# Patient Record
Sex: FEMALE | Race: WHITE | Employment: FULL TIME | URBAN - METROPOLITAN AREA
[De-identification: names, ages, dates, MRNs, and addresses within clinical notes are randomized per-mention and may not be internally consistent; named-entity substitution may affect disease eponyms.]

---

## 2023-11-15 ENCOUNTER — APPOINTMENT (OUTPATIENT)
Dept: GENERAL RADIOLOGY | Age: 33
End: 2023-11-15
Payer: COMMERCIAL

## 2023-11-15 ENCOUNTER — HOSPITAL ENCOUNTER (EMERGENCY)
Age: 33
Discharge: HOME | End: 2023-11-15
Attending: EMERGENCY MEDICINE
Payer: COMMERCIAL

## 2023-11-15 VITALS
WEIGHT: 210 LBS | HEART RATE: 86 BPM | RESPIRATION RATE: 18 BRPM | TEMPERATURE: 97.5 F | SYSTOLIC BLOOD PRESSURE: 123 MMHG | OXYGEN SATURATION: 98 % | DIASTOLIC BLOOD PRESSURE: 76 MMHG

## 2023-11-15 DIAGNOSIS — S68.126A: ICD-10-CM

## 2023-11-15 DIAGNOSIS — T14.8XXA CRUSH INJURY: Primary | ICD-10-CM

## 2023-11-15 DIAGNOSIS — S82.899B: ICD-10-CM

## 2023-11-15 PROCEDURE — 2580000003 HC RX 258: Performed by: EMERGENCY MEDICINE

## 2023-11-15 PROCEDURE — 99284 EMERGENCY DEPT VISIT MOD MDM: CPT

## 2023-11-15 PROCEDURE — 96374 THER/PROPH/DIAG INJ IV PUSH: CPT

## 2023-11-15 PROCEDURE — 73130 X-RAY EXAM OF HAND: CPT

## 2023-11-15 PROCEDURE — 90714 TD VACC NO PRESV 7 YRS+ IM: CPT | Performed by: EMERGENCY MEDICINE

## 2023-11-15 PROCEDURE — 6360000002 HC RX W HCPCS

## 2023-11-15 PROCEDURE — 90471 IMMUNIZATION ADMIN: CPT | Performed by: EMERGENCY MEDICINE

## 2023-11-15 PROCEDURE — 6360000002 HC RX W HCPCS: Performed by: EMERGENCY MEDICINE

## 2023-11-15 RX ORDER — FENTANYL CITRATE 50 UG/ML
50 INJECTION, SOLUTION INTRAMUSCULAR; INTRAVENOUS ONCE
Status: COMPLETED | OUTPATIENT
Start: 2023-11-15 | End: 2023-11-15

## 2023-11-15 RX ORDER — HYDROCODONE BITARTRATE AND ACETAMINOPHEN 5; 325 MG/1; MG/1
1 TABLET ORAL EVERY 4 HOURS PRN
Qty: 10 TABLET | Refills: 0 | Status: SHIPPED | OUTPATIENT
Start: 2023-11-15 | End: 2023-11-18

## 2023-11-15 RX ORDER — CEPHALEXIN 500 MG/1
500 CAPSULE ORAL 4 TIMES DAILY
Qty: 40 CAPSULE | Refills: 0 | Status: SHIPPED | OUTPATIENT
Start: 2023-11-15 | End: 2023-11-25

## 2023-11-15 RX ORDER — TETANUS AND DIPHTHERIA TOXOIDS ADSORBED 2; 2 [LF]/.5ML; [LF]/.5ML
0.5 INJECTION INTRAMUSCULAR ONCE
Status: COMPLETED | OUTPATIENT
Start: 2023-11-15 | End: 2023-11-15

## 2023-11-15 RX ORDER — FENTANYL CITRATE 50 UG/ML
INJECTION, SOLUTION INTRAMUSCULAR; INTRAVENOUS
Status: COMPLETED
Start: 2023-11-15 | End: 2023-11-15

## 2023-11-15 RX ADMIN — FENTANYL CITRATE 50 MCG: 50 INJECTION, SOLUTION INTRAMUSCULAR; INTRAVENOUS at 15:51

## 2023-11-15 RX ADMIN — CEFAZOLIN 2000 MG: 2 INJECTION, POWDER, FOR SOLUTION INTRAMUSCULAR; INTRAVENOUS at 15:21

## 2023-11-15 RX ADMIN — TETANUS AND DIPHTHERIA TOXOIDS ADSORBED 0.5 ML: 2; 2 INJECTION INTRAMUSCULAR at 15:20

## 2023-11-15 ASSESSMENT — PAIN DESCRIPTION - PAIN TYPE: TYPE: ACUTE PAIN

## 2023-11-15 ASSESSMENT — PAIN - FUNCTIONAL ASSESSMENT: PAIN_FUNCTIONAL_ASSESSMENT: 0-10

## 2023-11-15 ASSESSMENT — PAIN SCALES - GENERAL
PAINLEVEL_OUTOF10: 8
PAINLEVEL_OUTOF10: 8

## 2023-11-15 ASSESSMENT — PAIN DESCRIPTION - DESCRIPTORS: DESCRIPTORS: ACHING

## 2023-11-15 ASSESSMENT — PAIN DESCRIPTION - FREQUENCY: FREQUENCY: CONTINUOUS

## 2023-11-15 ASSESSMENT — PAIN DESCRIPTION - ORIENTATION: ORIENTATION: RIGHT

## 2023-11-15 ASSESSMENT — PAIN DESCRIPTION - LOCATION: LOCATION: HAND

## 2023-11-16 ENCOUNTER — TELEPHONE (OUTPATIENT)
Dept: ORTHOPEDIC SURGERY | Age: 33
End: 2023-11-16

## 2023-11-21 ENCOUNTER — OFFICE VISIT (OUTPATIENT)
Dept: ORTHOPEDIC SURGERY | Age: 33
End: 2023-11-21

## 2023-11-21 VITALS — WEIGHT: 210 LBS | BODY MASS INDEX: 31.1 KG/M2 | HEIGHT: 69 IN

## 2023-11-21 DIAGNOSIS — S62.639A CLOSED FRACTURE OF TUFT OF DISTAL PHALANX OF FINGER: Primary | ICD-10-CM

## 2023-11-21 DIAGNOSIS — S67.10XA CRUSHING INJURY OF FINGER, INITIAL ENCOUNTER: ICD-10-CM

## 2023-11-21 PROCEDURE — 99203 OFFICE O/P NEW LOW 30 MIN: CPT | Performed by: STUDENT IN AN ORGANIZED HEALTH CARE EDUCATION/TRAINING PROGRAM

## 2023-11-21 RX ORDER — CEPHALEXIN 500 MG/1
500 CAPSULE ORAL 4 TIMES DAILY
Qty: 56 CAPSULE | Refills: 0 | Status: SHIPPED | OUTPATIENT
Start: 2023-11-21 | End: 2023-12-05

## 2023-11-21 NOTE — PROGRESS NOTES
[facility]  ORTHOPAEDIC SURGERY  DATE OF VISIT: 11/21/23  New Wrist/Hand Patient Visit     Referring Provider:   11/15/2023 (3 hours)  [facility]    CHIEF COMPLAINT:   Chief Complaint   Patient presents with    ED Follow-up     11/15/2023: R small finger open tuft fx. KARISSA injury, FREDRICK HANCOCK. HPI:     Vito Byrd is a 26y.o. year old female who is seen today  for evaluation of right small finger pain. She reports the pain has been ongoing since the time of the injury  She does recall a specific injury which started the pain. Medical machine resulting in open fracture of her right small finger distal phalanx. She  reports the pain is worse with use, better with rest.  The patient does not have mechanical symptoms. She does not have night pain. She denies a feeling of instability. The prior treatments have been irrigation and debridement with closure of the wound in the ED and she was placed on Keflex. The patient has not responded to the treatment. The patient is right hand dominant. The patient is working. 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 Patient states that she took down her dressing from the ED and has been placing Vaseline and other ointments on her hand and finger regularly. PAST MEDICAL HISTORY  No past medical history on file. PAST SURGICAL HISTORY  No past surgical history on file. FAMILY HISTORY   No family history on file.     SOCIAL HISTORY  Social History     Occupational History    Not on file   Tobacco Use    Smoking status: Not on file    Smokeless tobacco: Not on file   Substance and Sexual Activity    Alcohol use: Not on file    Drug use: Not on file    Sexual activity: Not on file       CURRENT MEDICATIONS     Current Outpatient Medications:     cephALEXin (KEFLEX) 500 MG capsule, Take 1 capsule by mouth 4 times daily for 10 days, Disp: 40 capsule, Rfl: 0    ALLERGIES  Allergies   Allergen Reactions

## 2023-11-22 ENCOUNTER — TELEPHONE (OUTPATIENT)
Dept: ORTHOPEDIC SURGERY | Age: 33
End: 2023-11-22

## 2023-11-28 ENCOUNTER — OFFICE VISIT (OUTPATIENT)
Dept: ORTHOPEDIC SURGERY | Age: 33
End: 2023-11-28

## 2023-11-28 DIAGNOSIS — S62.639A CLOSED FRACTURE OF TUFT OF DISTAL PHALANX OF FINGER: Primary | ICD-10-CM

## 2023-11-28 DIAGNOSIS — S67.10XA CRUSHING INJURY OF FINGER, INITIAL ENCOUNTER: ICD-10-CM

## 2023-11-28 PROCEDURE — 99213 OFFICE O/P EST LOW 20 MIN: CPT | Performed by: STUDENT IN AN ORGANIZED HEALTH CARE EDUCATION/TRAINING PROGRAM

## 2023-11-28 RX ORDER — DOXYCYCLINE HYCLATE 100 MG
100 TABLET ORAL 2 TIMES DAILY
Qty: 14 TABLET | Refills: 0 | Status: SHIPPED | OUTPATIENT
Start: 2023-11-28 | End: 2023-12-05

## 2023-11-28 RX ORDER — CETIRIZINE HYDROCHLORIDE 10 MG/1
10 TABLET ORAL DAILY
Qty: 30 TABLET | Refills: 0 | Status: SHIPPED | OUTPATIENT
Start: 2023-11-28 | End: 2023-12-28

## 2023-11-28 NOTE — PROGRESS NOTES
[facility]  ORTHOPAEDIC SURGERY   DATE OF VISIT: 11/28/23  Follow Up Visit     CHIEF COMPLAINT:   Chief Complaint   Patient presents with    Wound Check     1 week f/u R small finger open tuft fx         Rosalio Gray returns today for follow up visit regarding the open mario fracture of her right small finger. Treatment thus far has included ED I&D with closure by ortho resident and peroxide dilute soaking and abx. She reports symptoms are improving. Physical Exam:     No data recorded    General: Alert and oriented x3, no acute distress  Psych: mentation normal, non pressured speech   Cardiovascular/pulmonary: No labored breathing, peripheral perfusion intact  Musculoskeletal:    Hand/Wrist:  Tissue maceration at previous exam has significantly improved there is good eschar formation over the wounds now  Monocryl sutures in place. There is also a dorsal wound over the proximal phalanx of the ring finger without involvement of the extensor tendon. This has also had a good eschar begin to form  She states she has limited sensation over the ulnar and radial digital nerve distributions of the distal phalanx of her small finger  +2 radial pulse she is able to make a fist and extend the hand other than limited motion of her small finger secondary to pain    Controlled Substances Monitoring:      Imaging:  Prior imaging reviewed: XRAY:  3 views of the right hand demonstrating soft tissue injury with tuft fracture about the distal phalanx of the small finger.   Imaging also demonstrates radiopaque object      Assessment:   Open tuft fracture of the right small finger distal phalanx with laceration to the dorsum of the right ring finger without involvement of the extensor tendon with good healing      Plan:   Continue with nonweightbearing right hand  Ulnar gutter splint given  That she will increase her dilute peroxide soaks to twice a day  I have switched her from her Keflex to doxycycline since she states she

## 2023-12-05 ENCOUNTER — OFFICE VISIT (OUTPATIENT)
Dept: ORTHOPEDIC SURGERY | Age: 33
End: 2023-12-05

## 2023-12-05 DIAGNOSIS — S67.10XA CRUSHING INJURY OF FINGER, INITIAL ENCOUNTER: ICD-10-CM

## 2023-12-05 DIAGNOSIS — S62.639A CLOSED FRACTURE OF TUFT OF DISTAL PHALANX OF FINGER: Primary | ICD-10-CM

## 2023-12-05 NOTE — PROGRESS NOTES
given- use when not in controlled environments  Continue dilute peroxide soaks for 1 more week. She has completed her abx and rash has improved  She will begin OT to work on ROM   Follow up in 2 weeks for repeat evaluation    E/M EST Level 3- Greater than 20 minutes were spent with patient including history, exam, review of imaging (not including talking), discussion of diagnosis and treatment options, answering questions, and documentation. Christoph GHOTRA  [address] [facility]   Orthopaedic Surgery   12/5/23  3:38 PM

## 2023-12-07 ENCOUNTER — TELEPHONE (OUTPATIENT)
Dept: PHYSICAL THERAPY | Age: 33
End: 2023-12-07

## 2023-12-14 ENCOUNTER — EVALUATION (OUTPATIENT)
Dept: OCCUPATIONAL THERAPY | Age: 33
End: 2023-12-14

## 2023-12-14 DIAGNOSIS — S62.639A CLOSED FRACTURE OF TUFT OF DISTAL PHALANX OF FINGER: Primary | ICD-10-CM

## 2023-12-14 DIAGNOSIS — S67.10XA CRUSHING INJURY OF FINGER, INITIAL ENCOUNTER: ICD-10-CM

## 2023-12-14 NOTE — PROGRESS NOTES
LE Dressing:  X        Toileting:  X        Transfers:  X          Comments: Pt reports guarding of R hand SF for nearly all ADL tasks due to limited mobility.     Pain Level: No pain at rest, but when hitting digit on any surface, pain increases to 7/10 throbbing pain    UE Assessment:  Comment: Hand Dominance is right  All sites exhibit closure - superficial to dorsal aspect of PIP, fingernail, and handley aspect of DIP (healing sites on back of dorsal aspects of LF and SF)  Sutures scabs still visible  Skin very dry on all aspects of SF  Thick tissue granulation on handley aspect of SF   Moderate scar tissue beneath all sites  Opp pinching intact  Composite fist intact with exception of SF    R Hand ROM   AROM [x]         PROM[] IE        4th Digit MCP Flexion/Extension */ 0-45 H 81*       PIP Flexion/Extension 100*/ 0 100*       DIP Flexion/Extension 70-90*/0 75*        5th Digit MCP Flexion/Extension */ 0-45 H 83*       PIP Flexion/Extension 100*/ 0 46*       DIP Flexion/Extension 70-90*/0 14*        Sensation:   Numbness reported on handley aspect of RF  No hypersensitivity reported upon palpation to tip of SF but reported when SF hits another object  Numbness reported superficial to nail on SF    Able To Sense (Y) / Unable to Sense (N)  SEMMES-CAMERON Thumb 2nd Digit 3rd Digit  4th Digit  5th Digit    Normal Touch  Size: 2.83 Y Y Y     Diminished Light Touch   Size: 3.61    Y Y   Diminished Protective Sense  Size: 4.31        Loss of Protective Sense   Size: 4.56        Loss of Sensation  Size: 6.65          Edema Description/Circumferential Measurements:   Superficial to PIP joint of SF--   R: 8.0 cm   L: 7.5 cm     Figure 8 Measurement--   R: 45.3 cm   L: 43.8 cm    Dynamometer (setting 2) IE       Left deferred        Right  deferred       Pinch (lateral)         Left  deferred       Right  deferred       Pinch (tripod)         Left  deferred       Right  deferred          9 Hole Peg test  IE

## 2023-12-27 ENCOUNTER — TREATMENT (OUTPATIENT)
Dept: OCCUPATIONAL THERAPY | Age: 33
End: 2023-12-27

## 2023-12-27 DIAGNOSIS — S62.639A CLOSED FRACTURE OF TUFT OF DISTAL PHALANX OF FINGER: Primary | ICD-10-CM

## 2023-12-27 DIAGNOSIS — S67.10XA CRUSHING INJURY OF FINGER, INITIAL ENCOUNTER: ICD-10-CM

## 2023-12-27 NOTE — PROGRESS NOTES
using SF x30 reps  -Big peg activity x25 reps  -Rotate dice x10 reps   Wound Care X -Attempt to remove pinpoint object on tip of SF   Strengthening:     R hand digital  Deferred until cleared for WB by physician   R UE Forearm/Wrist  Deferred until cleared for WB by physician   Other:     Splints X -Size Large silicone tube issued to protect R hand SF tip due to hypersensitivity          Assessment/Comments: Pt is cleared for WBAT as tolerated starting January 2, as per most recent physician follow-up appointment. X-ray was interpreted to reveal maintained alignment of tuft fracture. No sharp pains reportedfollowing fall onto affected hand indicates fracture was unaffected with increased edema noted. Attempt was made to remove pinpoint object on tip of SF. Pt educated that if this is a suture or splinter, it should continue to work itself out of the skin and will continue to be monitored. ROM gains shown below. PROM introduced within tolerance to IP joints of SF. Pt demonstrated mild irritation with deconstruction of bunch'ems due to hypersensitivity but was able to complete assigned task. Next session initiate weight-bearing as tolerated. IE 12/27     5th Digit MCP Flexion/Extension */ 0-45 H 83*  88*         PIP Flexion/Extension 100*/ 0 46* 82*          DIP Flexion/Extension 70-90*/0 14* 48*            -Rehab Potential: Good   -Patient Response to Treatment: Pt verbalized continued dedication to HEP. Patient. Education:  [x] Plans/Goals, Risks/Benefits discussed  [x] Home exercise program  Method of Education: [x] Verbal  [x] Demo  [] Written  Comprehension of Education:  [x] Verbalizes understanding. [x] Demonstrates understanding. [] Needs Review. [] Demonstrates/verbalizes understanding of HEP/Ed previously given.     Time In:11:00 am            Time Out: 11:55 am             CODE  Minutes  Units   09807 Fluidotherapy     44521 Paraffin 10 1   72062 Ultrasound     69621 Electrical Stim -

## 2024-01-04 ENCOUNTER — TREATMENT (OUTPATIENT)
Dept: OCCUPATIONAL THERAPY | Age: 34
End: 2024-01-04

## 2024-01-04 DIAGNOSIS — S62.639A CLOSED FRACTURE OF TUFT OF DISTAL PHALANX OF FINGER: Primary | ICD-10-CM

## 2024-01-04 DIAGNOSIS — S67.10XA CRUSHING INJURY OF FINGER, INITIAL ENCOUNTER: ICD-10-CM

## 2024-01-04 NOTE — PROGRESS NOTES
-Exposure to 3 textures for desensitization and to promote digital nerve regeneration + HEP   Fine Motor          -In-hand manipulation of graded particles (foam pieces, marbles, pennies)  -Small peg activity with thumb/SF x15 reps place/remove  -Deconstruct bunch'ems using SF x30 reps  -Big peg activity x25 reps  -Rotate dice x10 reps   Wound Care  -Attempt to remove pinpoint object on tip of SF   Strengthening:     R hand digital  -Light resistance putty: pick pegs out of putty   R UE Forearm/Wrist     Other:     Splints X -Size Large silicone tube issued to protect R hand SF tip due to hypersensitivity          Assessment/Comments: Pt is now able to initiate WB as per most recent physician note. See baseline measurements below. Goal upgraded to address endurance for resistive activity with incorporation of SF as /pinch strength is WNLs for age/gender (referring physician cosign required for approval). PIP extension lag detected this session with education on causation. PIP extension place and holds introduced and added to HEP with successful return demonstration. Scar pads issued to align scar collagen fibers. Edema glove issued for wear during HoS for compression of scar pads and to reduce edema. PROM heavily incorporated this session due to no weight-bearing restrictions. This advanced active motion as shown below. Next session, address improving endurance for resistive use of SF.      IE 12/27 01/04    5th Digit MCP Flexion/Extension */ 0-45 H 83*  88*  90*       PIP Flexion/Extension 100*/ 0 46* 82*   -11*/92*       DIP Flexion/Extension 70-90*/0 14* 48*   55*             Dynamometer (setting 2) IE  01/04     Left deferred  93#      Right  deferred  90#     Pinch (lateral)         Left  deferred  26#     Right  deferred  29#     Pinch (tripod)         Left  deferred  23#     Right  deferred  27#    **Norm for age/gender for  strength is 74# and 66#  **Norm for age/gender for lateral key pinch

## 2024-01-11 ENCOUNTER — TREATMENT (OUTPATIENT)
Dept: OCCUPATIONAL THERAPY | Age: 34
End: 2024-01-11

## 2024-01-11 DIAGNOSIS — S67.10XA CRUSHING INJURY OF FINGER, INITIAL ENCOUNTER: ICD-10-CM

## 2024-01-11 DIAGNOSIS — S62.639A CLOSED FRACTURE OF TUFT OF DISTAL PHALANX OF FINGER: Primary | ICD-10-CM

## 2024-01-11 NOTE — PROGRESS NOTES
OCCUPATIONAL THERAPY DAILY NOTE    [facility] PHYSICIANS [place] [facility] OT  [address] [facility] [address]  Dept: [phone]  Loc: [phone]   [facility] OT Fax: [phone]      Date:  2023  Initial Evaluation Date: 2023   Evaluating Therapist: Lady Bridges OT    Patient Name:  Ciera Perry    :  1990    Restrictions/Precautions:  As per protocol for distal phalanx tuft fracture, low fall risk  Diagnosis:    S62.639A (ICD-10-CM) - Closed fracture of tuft of distal phalanx of finger  S67.10XA (ICD-10-CM) - Crushing injury of finger, initial encounter  Date of Surgery/Injury: 11/15/2023 inj (4 weeks)     Insurance/Certification information: [place] [facility] - Claim #[id number]  Plan of care signed (Y/N): N  Visit# / total visits: -     Referring Practitioner:  Cristóbal Olivia DO   Specific Practitioner Orders: OT Evaluate and Treat     NWB to right hand     Ulnar gutter splint previously issued for wear when not in controlled environments     Work on ROM     Assessment of current deficits   [] Functional mobility             [x] ADLs           [x] Strength                  [] Cognition   [] Functional transfers           [x] IADLs          [] Safety Awareness  [x] Endurance   [x] Fine Motor Coordination    [] Balance      [] Vision/perception    [x] Sensation     [] Gross Motor Coordination [x] ROM           [x] Pain                        [x] Edema          [x] Scar Adhesion/Skin Integrity      OT PLAN OF CARE   OT POC based on physician orders, patient diagnosis and results of clinical assessment     Frequency/Duration 1-2x / week for 8-12 visits.   Certification period From: 2023  To: 2023      Specific OT Treatment to include:   [x] Instruction in HEP                   Modalities:  [x] Therapeutic Exercise                 [x] Ultrasound               [] Electrical Stimulation/Attended  [x] PROM/Stretching                    [x]

## 2024-01-16 ENCOUNTER — OFFICE VISIT (OUTPATIENT)
Dept: ORTHOPEDIC SURGERY | Age: 34
End: 2024-01-16
Payer: COMMERCIAL

## 2024-01-16 VITALS — WEIGHT: 210 LBS | HEIGHT: 69 IN | BODY MASS INDEX: 31.1 KG/M2

## 2024-01-16 DIAGNOSIS — S62.639A CLOSED FRACTURE OF TUFT OF DISTAL PHALANX OF FINGER: Primary | ICD-10-CM

## 2024-01-16 PROCEDURE — 99213 OFFICE O/P EST LOW 20 MIN: CPT | Performed by: STUDENT IN AN ORGANIZED HEALTH CARE EDUCATION/TRAINING PROGRAM

## 2024-01-16 NOTE — PROGRESS NOTES
work  Continue to work on motion progress at OT  Her strength is coming along nicely in the hand  Plan for follow-up in 6 to 8 weeks for repeat evaluation and imaging at that point in time.    E/M EST Level 3- Greater than 20 minutes were spent with patient including history, exam, review of imaging (not including talking), discussion of diagnosis and treatment options, answering questions, and documentation.       Cristóbal Olivia DO   Orthopaedic Surgery   1/16/24  3:11 PM

## 2024-01-18 ENCOUNTER — TREATMENT (OUTPATIENT)
Dept: OCCUPATIONAL THERAPY | Age: 34
End: 2024-01-18

## 2024-01-18 DIAGNOSIS — S67.10XA CRUSHING INJURY OF FINGER, INITIAL ENCOUNTER: ICD-10-CM

## 2024-01-18 DIAGNOSIS — S62.639A CLOSED FRACTURE OF TUFT OF DISTAL PHALANX OF FINGER: Primary | ICD-10-CM

## 2024-01-18 NOTE — PROGRESS NOTES
Flexion/Extension */ 0-45 H 83*  88*  90*  -- --     PIP Flexion/Extension 100*/ 0 46* 82*   -11*/92* -19*/ 96* -11*/100*     DIP Flexion/Extension 70-90*/0 14* 48*   55*  60* 62*           Dynamometer (setting 2) IE  01/04     Left deferred  93#      Right  deferred  90#     Pinch (lateral)         Left  deferred  26#     Right  deferred  29#     Pinch (tripod)         Left  deferred  23#     Right  deferred  27#    **Norm for age/gender for  strength is 74# and 66#  **Norm for age/gender for lateral key pinch is 17# and 16#  **Norm for age/gender for lateral key pinch is 12# and 12#    -Rehab Potential: Good   -Patient Response to Treatment: Pt demonstrated max difficulty with place and holds.    Patient. Education:  [x] Plans/Goals, Risks/Benefits discussed  [x] Home exercise program  Method of Education: [x] Verbal  [x] Demo  [] Written  Comprehension of Education:  [x] Verbalizes understanding.  [x] Demonstrates understanding.  [] Needs Review.  [] Demonstrates/verbalizes understanding of HEP/Ed previously given.    Time In:11:00 am            Time Out: 11:55 am           CODE  Minutes Time In/Time Out  Units   03134 Fluidotherapy      94828 Paraffin 10 11 - 11:10 am 1   75653 Ultrasound      52287 Electrical Stim - Attended      75011 Iontophoresis      22739 Therapeutic Ex 30 11:25 - 11:55 am 2   17899 Therapeutic Activity      47679 Neuromuscular Re-Ed      08993 Manual Therapy 15 11:10 - 11:25 am 1   26566 ADL/COMP Tech Train      19226 Orthotic Management/Training       Other                    Total  55  4       Plan: OT 1-2x/week for 8-12 sessions    [x]  Continues Plan of care with focus on to reduce pain/hypersensitivity/edema/scar tissue, increase ROM, improve in-hand manipulation, strengthen when able, and to issue home exercise and splinting program for improved incorporation of R hand SF into tasks: Treatment covered based on POC and graduated to patient's progress.   Pt education

## 2024-01-25 ENCOUNTER — TREATMENT (OUTPATIENT)
Dept: OCCUPATIONAL THERAPY | Age: 34
End: 2024-01-25

## 2024-01-25 DIAGNOSIS — S62.639A CLOSED FRACTURE OF TUFT OF DISTAL PHALANX OF FINGER: Primary | ICD-10-CM

## 2024-01-25 DIAGNOSIS — S67.10XA CRUSHING INJURY OF FINGER, INITIAL ENCOUNTER: ICD-10-CM

## 2024-01-25 NOTE — PROGRESS NOTES
OCCUPATIONAL THERAPY DAILY NOTE    PROGRESS UPDATE/DISCHARGE SUMMARY    [facility] PHYSICIANS [place] [facility]TY [facility] OT  [address] [facility] [address]  Dept: [phone]  Loc: [phone]   [facility] OT Fax: [phone]      Date:  2024  Initial Evaluation Date: 2023   Evaluating Therapist: Lady Bridges OT    Patient Name:  Ciera Perry    :  1990    Restrictions/Precautions:  As per protocol for distal phalanx tuft fracture, low fall risk  Diagnosis:    S62.639A (ICD-10-CM) - Closed fracture of tuft of distal phalanx of finger  S67.10XA (ICD-10-CM) - Crushing injury of finger, initial encounter  Date of Surgery/Injury: 11/15/2023 inj (4 weeks)     Insurance/Certification information: [place] [facility] - Claim #[id number]  Plan of care signed (Y/N): N  Visit# / total visits: -     Referring Practitioner:  Cristóbal Olivia DO   Specific Practitioner Orders: OT Evaluate and Treat     NWB to right hand     Ulnar gutter splint previously issued for wear when not in controlled environments     Work on ROM     Assessment of current deficits   [] Functional mobility             [x] ADLs           [x] Strength                  [] Cognition   [] Functional transfers           [x] IADLs          [] Safety Awareness  [x] Endurance   [x] Fine Motor Coordination    [] Balance      [] Vision/perception    [x] Sensation     [] Gross Motor Coordination [x] ROM           [x] Pain                        [x] Edema          [x] Scar Adhesion/Skin Integrity      OT PLAN OF CARE   OT POC based on physician orders, patient diagnosis and results of clinical assessment     Frequency/Duration 1-2x / week for 8-12 visits.   Certification period From: 2023  To: 2023      Specific OT Treatment to include:   [x] Instruction in HEP                   Modalities:  [x] Therapeutic Exercise                 [x] Ultrasound               [] Electrical Stimulation/Attended  [x]

## 2024-03-12 ENCOUNTER — OFFICE VISIT (OUTPATIENT)
Dept: ORTHOPEDIC SURGERY | Age: 34
End: 2024-03-12
Payer: COMMERCIAL

## 2024-03-12 DIAGNOSIS — S62.639A CLOSED FRACTURE OF TUFT OF DISTAL PHALANX OF FINGER: Primary | ICD-10-CM

## 2024-03-12 PROCEDURE — 99213 OFFICE O/P EST LOW 20 MIN: CPT | Performed by: STUDENT IN AN ORGANIZED HEALTH CARE EDUCATION/TRAINING PROGRAM

## 2024-03-12 NOTE — PROGRESS NOTES
[facility]  ORTHOPAEDIC SURGERY   DATE OF VISIT: 03/12/24  Follow Up Visit     CHIEF COMPLAINT:   Chief Complaint   Patient presents with    Follow-up     Right hand  pinkly finger     finished OT a while now   not painful  still limited Rom          Ciera Perry returns today for follow up visit regarding the open mario fracture of her right small finger. Treatment thus far has included ED I&D with closure by ortho resident and peroxide dilute soaking and abx which originally occurred on 11/15/23.  She reports symptoms continuing to improve .  She has been making good progress in OT  Physical Exam:     No data recorded    General: Alert and oriented x3, no acute distress  Psych: mentation normal, non pressured speech   Cardiovascular/pulmonary: No labored breathing, peripheral perfusion intact  Musculoskeletal:    Hand/Wrist:  Scar tissue around the small finger has matured  She states she has limited sensation over the ulnar and radial digital nerve distributions of the distal phalanx of her small finger  She has no tenderness palpation around the tuft of the distal phalanx.  Nail is coming in nicely  +2 radial pulse she is able to make a fist and extend the hand with improved motion of the fifth digit    Controlled Substances Monitoring:      Imaging:  Prior imaging reviewed: XRAY:  3 views of the right hand demonstrating  tuft fracture about the distal phalanx of the small finger.  Previous soft tissue wounds have healed and alignment of the tuft fracture is maintained compared to prior studies interval callus formation is now present as well as some bony resorption      Assessment:   Open tuft fracture of the right small finger distal phalanx with laceration to the dorsum of the right ring finger without involvement of the extensor tendon with good healing      Plan:   Okay to begin weightbearing in the hand   Ok to return to work full duty  Follow up as needed     E/M EST Level 3- Greater than 20 minutes